# Patient Record
Sex: FEMALE | Race: OTHER | NOT HISPANIC OR LATINO
[De-identification: names, ages, dates, MRNs, and addresses within clinical notes are randomized per-mention and may not be internally consistent; named-entity substitution may affect disease eponyms.]

---

## 2017-04-24 ENCOUNTER — APPOINTMENT (OUTPATIENT)
Dept: OBGYN | Facility: CLINIC | Age: 57
End: 2017-04-24

## 2017-04-24 VITALS
DIASTOLIC BLOOD PRESSURE: 80 MMHG | BODY MASS INDEX: 27.94 KG/M2 | HEIGHT: 61 IN | WEIGHT: 148 LBS | SYSTOLIC BLOOD PRESSURE: 120 MMHG

## 2017-04-24 DIAGNOSIS — N95.2 POSTMENOPAUSAL ATROPHIC VAGINITIS: ICD-10-CM

## 2017-04-24 DIAGNOSIS — Z01.419 ENCOUNTER FOR GYNECOLOGICAL EXAMINATION (GENERAL) (ROUTINE) W/OUT ABNORMAL FINDINGS: ICD-10-CM

## 2017-05-03 LAB — CYTOLOGY CVX/VAG DOC THIN PREP: NORMAL

## 2017-05-30 ENCOUNTER — FORM ENCOUNTER (OUTPATIENT)
Age: 57
End: 2017-05-30

## 2017-05-31 ENCOUNTER — APPOINTMENT (OUTPATIENT)
Dept: MAMMOGRAPHY | Facility: IMAGING CENTER | Age: 57
End: 2017-05-31

## 2017-05-31 ENCOUNTER — APPOINTMENT (OUTPATIENT)
Dept: ULTRASOUND IMAGING | Facility: IMAGING CENTER | Age: 57
End: 2017-05-31

## 2017-05-31 ENCOUNTER — APPOINTMENT (OUTPATIENT)
Dept: RADIOLOGY | Facility: IMAGING CENTER | Age: 57
End: 2017-05-31

## 2017-05-31 ENCOUNTER — OUTPATIENT (OUTPATIENT)
Dept: OUTPATIENT SERVICES | Facility: HOSPITAL | Age: 57
LOS: 1 days | End: 2017-05-31
Payer: COMMERCIAL

## 2017-05-31 DIAGNOSIS — N95.2 POSTMENOPAUSAL ATROPHIC VAGINITIS: ICD-10-CM

## 2017-05-31 PROCEDURE — 77063 BREAST TOMOSYNTHESIS BI: CPT

## 2017-05-31 PROCEDURE — 76856 US EXAM PELVIC COMPLETE: CPT

## 2017-05-31 PROCEDURE — 77080 DXA BONE DENSITY AXIAL: CPT

## 2017-05-31 PROCEDURE — 76641 ULTRASOUND BREAST COMPLETE: CPT

## 2017-05-31 PROCEDURE — 77067 SCR MAMMO BI INCL CAD: CPT

## 2017-05-31 PROCEDURE — 76830 TRANSVAGINAL US NON-OB: CPT

## 2017-06-20 ENCOUNTER — APPOINTMENT (OUTPATIENT)
Dept: ULTRASOUND IMAGING | Facility: IMAGING CENTER | Age: 57
End: 2017-06-20

## 2017-10-12 ENCOUNTER — APPOINTMENT (OUTPATIENT)
Dept: ULTRASOUND IMAGING | Facility: IMAGING CENTER | Age: 57
End: 2017-10-12
Payer: COMMERCIAL

## 2017-10-12 ENCOUNTER — OUTPATIENT (OUTPATIENT)
Dept: OUTPATIENT SERVICES | Facility: HOSPITAL | Age: 57
LOS: 1 days | End: 2017-10-12
Payer: COMMERCIAL

## 2017-10-12 DIAGNOSIS — Z00.8 ENCOUNTER FOR OTHER GENERAL EXAMINATION: ICD-10-CM

## 2017-10-12 PROCEDURE — 76700 US EXAM ABDOM COMPLETE: CPT | Mod: 26

## 2017-10-12 PROCEDURE — 76700 US EXAM ABDOM COMPLETE: CPT

## 2017-10-24 ENCOUNTER — APPOINTMENT (OUTPATIENT)
Dept: ORTHOPEDIC SURGERY | Facility: CLINIC | Age: 57
End: 2017-10-24

## 2017-11-28 ENCOUNTER — APPOINTMENT (OUTPATIENT)
Dept: MRI IMAGING | Facility: IMAGING CENTER | Age: 57
End: 2017-11-28
Payer: OTHER MISCELLANEOUS

## 2017-11-28 ENCOUNTER — OUTPATIENT (OUTPATIENT)
Dept: OUTPATIENT SERVICES | Facility: HOSPITAL | Age: 57
LOS: 1 days | End: 2017-11-28
Payer: COMMERCIAL

## 2017-11-28 DIAGNOSIS — Z00.8 ENCOUNTER FOR OTHER GENERAL EXAMINATION: ICD-10-CM

## 2017-11-28 PROCEDURE — 72141 MRI NECK SPINE W/O DYE: CPT | Mod: 26

## 2017-11-28 PROCEDURE — 72141 MRI NECK SPINE W/O DYE: CPT

## 2018-01-04 ENCOUNTER — APPOINTMENT (OUTPATIENT)
Dept: MRI IMAGING | Facility: IMAGING CENTER | Age: 58
End: 2018-01-04

## 2018-01-09 ENCOUNTER — OUTPATIENT (OUTPATIENT)
Dept: OUTPATIENT SERVICES | Facility: HOSPITAL | Age: 58
LOS: 1 days | End: 2018-01-09
Payer: COMMERCIAL

## 2018-01-09 ENCOUNTER — APPOINTMENT (OUTPATIENT)
Dept: MRI IMAGING | Facility: IMAGING CENTER | Age: 58
End: 2018-01-09
Payer: COMMERCIAL

## 2018-01-09 ENCOUNTER — APPOINTMENT (OUTPATIENT)
Dept: MRI IMAGING | Facility: IMAGING CENTER | Age: 58
End: 2018-01-09
Payer: OTHER MISCELLANEOUS

## 2018-01-09 DIAGNOSIS — Z00.8 ENCOUNTER FOR OTHER GENERAL EXAMINATION: ICD-10-CM

## 2018-01-09 PROCEDURE — 73221 MRI JOINT UPR EXTREM W/O DYE: CPT | Mod: 26,LT

## 2018-01-09 PROCEDURE — A9585: CPT

## 2018-01-09 PROCEDURE — 74183 MRI ABD W/O CNTR FLWD CNTR: CPT

## 2018-01-09 PROCEDURE — 82565 ASSAY OF CREATININE: CPT

## 2018-01-09 PROCEDURE — 74183 MRI ABD W/O CNTR FLWD CNTR: CPT | Mod: 26

## 2018-01-09 PROCEDURE — 73221 MRI JOINT UPR EXTREM W/O DYE: CPT

## 2018-03-16 ENCOUNTER — APPOINTMENT (OUTPATIENT)
Dept: ORTHOPEDIC SURGERY | Facility: CLINIC | Age: 58
End: 2018-03-16
Payer: OTHER MISCELLANEOUS

## 2018-03-16 VITALS — SYSTOLIC BLOOD PRESSURE: 114 MMHG | DIASTOLIC BLOOD PRESSURE: 78 MMHG | HEART RATE: 116 BPM

## 2018-03-16 VITALS — BODY MASS INDEX: 26.43 KG/M2 | HEIGHT: 61 IN | WEIGHT: 140 LBS

## 2018-03-16 DIAGNOSIS — M54.12 RADICULOPATHY, CERVICAL REGION: ICD-10-CM

## 2018-03-16 PROCEDURE — 99204 OFFICE O/P NEW MOD 45 MIN: CPT

## 2018-03-16 RX ORDER — DICLOFENAC SODIUM 75 MG/1
75 TABLET, DELAYED RELEASE ORAL
Qty: 40 | Refills: 1 | Status: ACTIVE | COMMUNITY
Start: 2018-03-16 | End: 1900-01-01

## 2018-04-11 ENCOUNTER — APPOINTMENT (OUTPATIENT)
Dept: ORTHOPEDIC SURGERY | Facility: CLINIC | Age: 58
End: 2018-04-11
Payer: OTHER MISCELLANEOUS

## 2018-04-11 VITALS
WEIGHT: 140 LBS | HEIGHT: 61 IN | HEART RATE: 92 BPM | DIASTOLIC BLOOD PRESSURE: 82 MMHG | SYSTOLIC BLOOD PRESSURE: 125 MMHG | BODY MASS INDEX: 26.43 KG/M2

## 2018-04-11 DIAGNOSIS — Z86.39 PERSONAL HISTORY OF OTHER ENDOCRINE, NUTRITIONAL AND METABOLIC DISEASE: ICD-10-CM

## 2018-04-11 DIAGNOSIS — Z78.9 OTHER SPECIFIED HEALTH STATUS: ICD-10-CM

## 2018-04-11 DIAGNOSIS — M79.602 PAIN IN LEFT ARM: ICD-10-CM

## 2018-04-11 PROCEDURE — 99203 OFFICE O/P NEW LOW 30 MIN: CPT

## 2018-04-11 RX ORDER — BLOOD-GLUCOSE METER
W/DEVICE EACH MISCELLANEOUS
Qty: 1 | Refills: 0 | Status: ACTIVE | COMMUNITY
Start: 2017-11-15

## 2018-07-25 PROBLEM — Z78.9 ALCOHOL USE: Status: INACTIVE | Noted: 2018-03-16

## 2018-11-26 ENCOUNTER — OUTPATIENT (OUTPATIENT)
Dept: OUTPATIENT SERVICES | Facility: HOSPITAL | Age: 58
LOS: 1 days | End: 2018-11-26
Payer: COMMERCIAL

## 2018-11-26 ENCOUNTER — APPOINTMENT (OUTPATIENT)
Dept: MAMMOGRAPHY | Facility: IMAGING CENTER | Age: 58
End: 2018-11-26
Payer: COMMERCIAL

## 2018-11-26 DIAGNOSIS — Z00.8 ENCOUNTER FOR OTHER GENERAL EXAMINATION: ICD-10-CM

## 2018-11-26 PROCEDURE — 77067 SCR MAMMO BI INCL CAD: CPT | Mod: 26

## 2018-11-26 PROCEDURE — 77063 BREAST TOMOSYNTHESIS BI: CPT

## 2018-11-26 PROCEDURE — 77067 SCR MAMMO BI INCL CAD: CPT

## 2018-11-26 PROCEDURE — 77063 BREAST TOMOSYNTHESIS BI: CPT | Mod: 26

## 2020-09-09 ENCOUNTER — APPOINTMENT (OUTPATIENT)
Dept: OBGYN | Facility: CLINIC | Age: 60
End: 2020-09-09
Payer: MEDICARE

## 2020-09-09 VITALS
TEMPERATURE: 96.9 F | HEIGHT: 61 IN | WEIGHT: 128 LBS | DIASTOLIC BLOOD PRESSURE: 60 MMHG | BODY MASS INDEX: 24.17 KG/M2 | SYSTOLIC BLOOD PRESSURE: 102 MMHG

## 2020-09-09 DIAGNOSIS — R10.30 LOWER ABDOMINAL PAIN, UNSPECIFIED: ICD-10-CM

## 2020-09-09 PROCEDURE — 99213 OFFICE O/P EST LOW 20 MIN: CPT

## 2020-09-09 NOTE — PHYSICAL EXAM
[Normal] : uterus [No Bleeding] : there was no active vaginal bleeding [Uterine Prolapse] : uterine prolapse [Motion Tenderness] : there was no cervical motion tenderness [Tenderness] : nontender [Uterine Adnexae] : were not tender and not enlarged [Adnexa Tenderness] : were not tender [[email or web address]] : sight thickness by bladder

## 2020-09-16 LAB — CYTOLOGY CVX/VAG DOC THIN PREP: NORMAL

## 2020-09-19 ENCOUNTER — APPOINTMENT (OUTPATIENT)
Dept: ULTRASOUND IMAGING | Facility: IMAGING CENTER | Age: 60
End: 2020-09-19
Payer: MEDICARE

## 2020-09-19 ENCOUNTER — OUTPATIENT (OUTPATIENT)
Dept: OUTPATIENT SERVICES | Facility: HOSPITAL | Age: 60
LOS: 1 days | End: 2020-09-19
Payer: MEDICARE

## 2020-09-19 DIAGNOSIS — R10.30 LOWER ABDOMINAL PAIN, UNSPECIFIED: ICD-10-CM

## 2020-09-19 PROCEDURE — 76830 TRANSVAGINAL US NON-OB: CPT | Mod: 26

## 2020-09-19 PROCEDURE — 76830 TRANSVAGINAL US NON-OB: CPT
